# Patient Record
Sex: FEMALE | Race: WHITE | NOT HISPANIC OR LATINO | Employment: OTHER | URBAN - METROPOLITAN AREA
[De-identification: names, ages, dates, MRNs, and addresses within clinical notes are randomized per-mention and may not be internally consistent; named-entity substitution may affect disease eponyms.]

---

## 2018-07-17 ENCOUNTER — RECORDS - HEALTHEAST (OUTPATIENT)
Dept: LAB | Facility: CLINIC | Age: 73
End: 2018-07-17

## 2018-07-18 LAB — C REACTIVE PROTEIN LHE: 1.2 MG/DL (ref 0–0.8)

## 2019-04-30 ENCOUNTER — RECORDS - HEALTHEAST (OUTPATIENT)
Dept: LAB | Facility: CLINIC | Age: 74
End: 2019-04-30

## 2019-04-30 LAB
C REACTIVE PROTEIN LHE: 0.3 MG/DL (ref 0–0.8)
VIT B12 SERPL-MCNC: 473 PG/ML (ref 213–816)

## 2019-08-26 ENCOUNTER — RECORDS - HEALTHEAST (OUTPATIENT)
Dept: LAB | Facility: CLINIC | Age: 74
End: 2019-08-26

## 2019-08-26 LAB — C REACTIVE PROTEIN LHE: 0.4 MG/DL (ref 0–0.8)

## 2020-06-18 ENCOUNTER — RECORDS - HEALTHEAST (OUTPATIENT)
Dept: LAB | Facility: CLINIC | Age: 75
End: 2020-06-18

## 2020-06-19 LAB — C REACTIVE PROTEIN LHE: 0.3 MG/DL (ref 0–0.8)

## 2020-09-16 ENCOUNTER — RECORDS - HEALTHEAST (OUTPATIENT)
Dept: LAB | Facility: CLINIC | Age: 75
End: 2020-09-16

## 2020-09-17 LAB — C REACTIVE PROTEIN LHE: 0.4 MG/DL (ref 0–0.8)

## 2020-11-30 ENCOUNTER — RECORDS - HEALTHEAST (OUTPATIENT)
Dept: LAB | Facility: CLINIC | Age: 75
End: 2020-11-30

## 2020-12-01 LAB — C REACTIVE PROTEIN LHE: 0.5 MG/DL (ref 0–0.8)

## 2021-06-02 ENCOUNTER — OFFICE VISIT (OUTPATIENT)
Dept: DERMATOLOGY | Facility: CLINIC | Age: 76
End: 2021-06-02
Payer: MEDICARE

## 2021-06-02 VITALS — DIASTOLIC BLOOD PRESSURE: 71 MMHG | OXYGEN SATURATION: 98 % | HEART RATE: 70 BPM | SYSTOLIC BLOOD PRESSURE: 169 MMHG

## 2021-06-02 DIAGNOSIS — L81.4 LENTIGO: ICD-10-CM

## 2021-06-02 DIAGNOSIS — L30.9 DERMATITIS: Primary | ICD-10-CM

## 2021-06-02 DIAGNOSIS — L82.1 SEBORRHEIC KERATOSIS: ICD-10-CM

## 2021-06-02 PROCEDURE — 99203 OFFICE O/P NEW LOW 30 MIN: CPT | Performed by: DERMATOLOGY

## 2021-06-02 RX ORDER — OXYBUTYNIN CHLORIDE 15 MG/1
TABLET, EXTENDED RELEASE ORAL DAILY
COMMUNITY
Start: 2021-04-29

## 2021-06-02 RX ORDER — LOSARTAN POTASSIUM 100 MG/1
100 TABLET ORAL DAILY
COMMUNITY
Start: 2020-11-23

## 2021-06-02 RX ORDER — AMLODIPINE BESYLATE 5 MG/1
5 TABLET ORAL DAILY
COMMUNITY
Start: 2020-12-03

## 2021-06-02 RX ORDER — PREDNISONE 1 MG/1
2 TABLET ORAL DAILY
COMMUNITY
Start: 2021-04-14

## 2021-06-02 RX ORDER — FAMOTIDINE 20 MG
TABLET ORAL
COMMUNITY

## 2021-06-02 RX ORDER — BETAMETHASONE DIPROPIONATE 0.5 MG/G
CREAM TOPICAL
Qty: 100 G | Refills: 3 | Status: SHIPPED | OUTPATIENT
Start: 2021-06-02

## 2021-06-02 RX ORDER — SERTRALINE HYDROCHLORIDE 100 MG/1
TABLET, FILM COATED ORAL DAILY
COMMUNITY
Start: 2020-11-27

## 2021-06-02 RX ORDER — MULTIVIT-MIN/IRON/FOLIC ACID/K 18-600-40
CAPSULE ORAL
COMMUNITY

## 2021-06-02 NOTE — LETTER
6/2/2021         RE: Josette Lee  [address] [facility] [address]        Dear Colleague,    Thank you for referring your patient, Josette Lee, to the [facility]. Please see a copy of my visit note below.    Josette Lee is an extremely pleasant 75 year old year old female patient here today for itching on skin.   .   Patient states this has been present for years.  Patient reports the following symptoms:  itching.  Patient reports the following previous treatments otc.  These treatments did not work.  Patient reports the following modifying factors none.  Associated symptoms: using dial soap no emollients.  .  Patient has no other skin complaints today.  Remainder of the HPI, Meds, PMH, Allergies, FH, and SH was reviewed in chart.    No past medical history on file.    No past surgical history on file.     No family history on file.    Social History     Socioeconomic History     Marital status:      Spouse name: Not on file     Number of children: Not on file     Years of education: Not on file     Highest education level: Not on file   Occupational History     Not on file   Social Needs     Financial resource strain: Not on file     Food insecurity     Worry: Not on file     Inability: Not on file     Transportation needs     Medical: Not on file     Non-medical: Not on file   Tobacco Use     Smoking status: Not on file   Substance and Sexual Activity     Alcohol use: Not on file     Drug use: Not on file     Sexual activity: Not on file   Lifestyle     Physical activity     Days per week: Not on file     Minutes per session: Not on file     Stress: Not on file   Relationships     Social connections     Talks on phone: Not on file     Gets together: Not on file     Attends Tenriism service: Not on file     Active member of club or organization: Not on file     Attends meetings of clubs or organizations: Not on file     Relationship status: Not on file      Intimate partner violence     Fear of current or ex partner: Not on file     Emotionally abused: Not on file     Physically abused: Not on file     Forced sexual activity: Not on file   Other Topics Concern     Not on file   Social History Narrative     Not on file       Outpatient Encounter Medications as of 6/2/2021   Medication Sig Dispense Refill     amLODIPine (NORVASC) 5 MG tablet Take 5 mg by mouth daily       Ascorbic Acid (VITAMIN C) 500 MG CAPS        losartan (COZAAR) 100 MG tablet Take 100 mg by mouth daily       oxybutynin ER (DITROPAN XL) 15 MG 24 hr tablet daily       predniSONE (DELTASONE) 1 MG tablet Take 2 mg by mouth daily       sertraline (ZOLOFT) 100 MG tablet daily       Vitamin D, Cholecalciferol, 25 MCG (1000 UT) CAPS        No facility-administered encounter medications on file as of 6/2/2021.              O:   NAD, WDWN, Alert & Oriented, Mood & Affect wnl, Vitals stable   Here today with     SpO2 98%    General appearance normal   Vitals stable   Alert, oriented and in no acute distress     Xerosis and eczmeatous patches on arms    Stuck on papules and brown macules on trunk and ext      The remainder of expanded problem focused exam was normal; the following areas were examined:  conjunctiva/lids, face, neck, , chest, digits/nails, RUE, LUE.      Eyes: Conjunctivae/lids:Normal     ENT: Lips, buccal mucosa, tongue: normal    MSK:Normal    Cardiovascular: peripheral edema none    Pulm: Breathing Normal    Neuro/Psych: Orientation:Alert and Orientedx3 ; Mood/Affect:normal       A/P:  1. Seborrheic keratosis, lentigo,   2. Dermatitis  Pathophysiology discussed with pateint   Stop dial soap  Soap use discussed with patient   claritin in am  Zyrtec bedtime  Emollient use discussed with patient   Betamethasone twice daily  Return to clinic 4 weeks  It was a pleasure speaking to Josette Lee today.  Previous clinic notes and pertinent laboratory tests were reviewed prior to Josette ARIAS  Jesus's visit.  Skin care regimen reviewed with patient: Eliminate harsh soaps, i.e. Dial, zest, irsih spring; Mild soaps such as Cetaphil or Dove sensitive skin, avoid hot or cold showers, aggressive use of emollients including vanicream, cetaphil or cerave discussed with patient.          Again, thank you for allowing me to participate in the care of your patient.        Sincerely,        Emmett Gonzalez MD

## 2021-06-02 NOTE — PROGRESS NOTES
Josette Lee is an extremely pleasant 75 year old year old female patient here today for itching on skin.   .   Patient states this has been present for years.  Patient reports the following symptoms:  itching.  Patient reports the following previous treatments otc.  These treatments did not work.  Patient reports the following modifying factors none.  Associated symptoms: using dial soap no emollients.  .  Patient has no other skin complaints today.  Remainder of the HPI, Meds, PMH, Allergies, FH, and SH was reviewed in chart.    No past medical history on file.    No past surgical history on file.     No family history on file.    Social History     Socioeconomic History     Marital status:      Spouse name: Not on file     Number of children: Not on file     Years of education: Not on file     Highest education level: Not on file   Occupational History     Not on file   Social Needs     Financial resource strain: Not on file     Food insecurity     Worry: Not on file     Inability: Not on file     Transportation needs     Medical: Not on file     Non-medical: Not on file   Tobacco Use     Smoking status: Not on file   Substance and Sexual Activity     Alcohol use: Not on file     Drug use: Not on file     Sexual activity: Not on file   Lifestyle     Physical activity     Days per week: Not on file     Minutes per session: Not on file     Stress: Not on file   Relationships     Social connections     Talks on phone: Not on file     Gets together: Not on file     Attends [employer]ist service: Not on file     Active member of club or organization: Not on file     Attends meetings of clubs or organizations: Not on file     Relationship status: Not on file     Intimate partner violence     Fear of current or ex partner: Not on file     Emotionally abused: Not on file     Physically abused: Not on file     Forced sexual activity: Not on file   Other Topics Concern     Not on file   Social History Narrative      Not on file       Outpatient Encounter Medications as of 6/2/2021   Medication Sig Dispense Refill     amLODIPine (NORVASC) 5 MG tablet Take 5 mg by mouth daily       Ascorbic Acid (VITAMIN C) 500 MG CAPS        losartan (COZAAR) 100 MG tablet Take 100 mg by mouth daily       oxybutynin ER (DITROPAN XL) 15 MG 24 hr tablet daily       predniSONE (DELTASONE) 1 MG tablet Take 2 mg by mouth daily       sertraline (ZOLOFT) 100 MG tablet daily       Vitamin D, Cholecalciferol, 25 MCG (1000 UT) CAPS        No facility-administered encounter medications on file as of 6/2/2021.              O:   NAD, WDWN, Alert & Oriented, Mood & Affect wnl, Vitals stable   Here today with     SpO2 98%    General appearance normal   Vitals stable   Alert, oriented and in no acute distress     Xerosis and eczmeatous patches on arms    Stuck on papules and brown macules on trunk and ext      The remainder of expanded problem focused exam was normal; the following areas were examined:  conjunctiva/lids, face, neck, , chest, digits/nails, RUE, LUE.      Eyes: Conjunctivae/lids:Normal     ENT: Lips, buccal mucosa, tongue: normal    MSK:Normal    Cardiovascular: peripheral edema none    Pulm: Breathing Normal    Neuro/Psych: Orientation:Alert and Orientedx3 ; Mood/Affect:normal       A/P:  1. Seborrheic keratosis, lentigo,   2. Dermatitis  Pathophysiology discussed with pateint   Stop dial soap  Soap use discussed with patient   claritin in am  Zyrtec bedtime  Emollient use discussed with patient   Betamethasone twice daily  Return to clinic 4 weeks  It was a pleasure speaking to Josette Lee today.  Previous clinic notes and pertinent laboratory tests were reviewed prior to Josette Lee's visit.  Skin care regimen reviewed with patient: Eliminate harsh soaps, i.e. Dial, zest, irsih spring; Mild soaps such as Cetaphil or Dove sensitive skin, avoid hot or cold showers, aggressive use of emollients including vanicream, cetaphil or  cerave discussed with patient.

## 2021-06-02 NOTE — PATIENT INSTRUCTIONS
Proper skin care from Dr. Gonzalez- [facility]     Eliminate harsh soaps, i.e. Dial, Zest, Tamara Spring;   Use mild soaps such as Cetaphil or Dove Sensitive Skin   Avoid hot or cold showers   After showering, lightly dry off.    Aggressive use of a moisturizer (including Vanicream, Cetaphil, Aquaphor or Cerave)   We recommend using a tub that needs to be scooped out, not a pump. This has more of an oil base. It will hold moisture in your skin much better than a water base moisturizer. The ones recommended are non- pore clogging.       If you have any questions call [phone] and follow the prompts to Dr. Gonzalez's office.    Claritin (OTC) 1 tab in the morning   Zyrtec (OTC) 1 tab in the evening    Medicated cream

## 2021-06-02 NOTE — NURSING NOTE
Chief Complaint   Patient presents with     Derm Problem       Vitals:    06/02/21 1202   BP: (!) 169/71   BP Location: Right arm   Patient Position: Sitting   Cuff Size: Adult Large   Pulse: 70   SpO2: 98%     Wt Readings from Last 1 Encounters:   No data found for Wt       Ivelisse Arroyo LPN .................6/2/2021

## 2021-06-29 ENCOUNTER — OFFICE VISIT (OUTPATIENT)
Dept: DERMATOLOGY | Facility: CLINIC | Age: 76
End: 2021-06-29
Payer: MEDICARE

## 2021-06-29 VITALS — OXYGEN SATURATION: 99 % | HEIGHT: 60 IN | HEART RATE: 63 BPM

## 2021-06-29 DIAGNOSIS — L30.9 DERMATITIS: Primary | ICD-10-CM

## 2021-06-29 PROCEDURE — 99212 OFFICE O/P EST SF 10 MIN: CPT | Performed by: DERMATOLOGY

## 2021-06-29 NOTE — LETTER
6/29/2021         RE: Josette Lee  [address] [facility] [address]        Dear Colleague,    Thank you for referring your patient, Josette Lee, to the [facility]. Please see a copy of my visit note below.    Josette Lee is an extremely pleasant 75 year old year old female patient here today for f/u dermatitis, all clear.  Patient has no other skin complaints today.  Remainder of the HPI, Meds, PMH, Allergies, FH, and SH was reviewed in chart.    History reviewed. No pertinent past medical history.    History reviewed. No pertinent surgical history.     History reviewed. No pertinent family history.    Social History     Socioeconomic History     Marital status:      Spouse name: Not on file     Number of children: Not on file     Years of education: Not on file     Highest education level: Not on file   Occupational History     Not on file   Social Needs     Financial resource strain: Not on file     Food insecurity     Worry: Not on file     Inability: Not on file     Transportation needs     Medical: Not on file     Non-medical: Not on file   Tobacco Use     Smoking status: Current Every Day Smoker     Types: Cigarettes     Smokeless tobacco: Never Used   Substance and Sexual Activity     Alcohol use: Not on file     Drug use: Not on file     Sexual activity: Not on file   Lifestyle     Physical activity     Days per week: Not on file     Minutes per session: Not on file     Stress: Not on file   Relationships     Social connections     Talks on phone: Not on file     Gets together: Not on file     Attends Mor[employer] service: Not on file     Active member of club or organization: Not on file     Attends meetings of clubs or organizations: Not on file     Relationship status: Not on file     Intimate partner violence     Fear of current or ex partner: Not on file     Emotionally abused: Not on file     Physically abused: Not on file     Forced sexual activity:  Not on file   Other Topics Concern     Not on file   Social History Narrative     Not on file       Outpatient Encounter Medications as of 6/29/2021   Medication Sig Dispense Refill     amLODIPine (NORVASC) 5 MG tablet Take 5 mg by mouth daily       Ascorbic Acid (VITAMIN C) 500 MG CAPS        augmented betamethasone dipropionate (DIPROLENE-AF) 0.05 % external cream Apply sparingly to affected area twice daily as needed.  Do not apply to face. 100 g 3     losartan (COZAAR) 100 MG tablet Take 100 mg by mouth daily       oxybutynin ER (DITROPAN XL) 15 MG 24 hr tablet daily       predniSONE (DELTASONE) 1 MG tablet Take 2 mg by mouth daily       sertraline (ZOLOFT) 100 MG tablet daily       Vitamin D, Cholecalciferol, 25 MCG (1000 UT) CAPS        No facility-administered encounter medications on file as of 6/29/2021.              O:   NAD, WDWN, Alert & Oriented, Mood & Affect wnl, Vitals stable   Here today with     Pulse 63   Ht 1.524 m (5')   SpO2 99%    General appearance normal   Vitals stable   Alert, oriented and in no acute distress     Skin clear       Eyes: Conjunctivae/lids:Normal     ENT: Lips, buccal mucosa, tongue: normal    MSK:Normal    Cardiovascular: peripheral edema none    Pulm: Breathing Normal    Neuro/Psych: Orientation:Alert and Orientedx3 ; Mood/Affect:normal       A/P:  1. Dermatitis clear  Topicals prn   It was a pleasure speaking to Josette JUANA [place]un[place]toria today.  Previous clinic notes and pertinent laboratory tests were reviewed prior to Josette JUANA Lee's visit.  Skin care regimen reviewed with patient: Eliminate harsh soaps, i.e. Dial, zest, irsih spring; Mild soaps such as Cetaphil or Dove sensitive skin, avoid hot or cold showers, aggressive use of emollients including vanicream, cetaphil or cerave discussed with patient.    Return to clinic prn         Again, thank you for allowing me to participate in the care of your patient.        Sincerely,        Emmett Gonzalez MD

## 2021-06-29 NOTE — PROGRESS NOTES
Josette Lee is an extremely pleasant 75 year old year old female patient here today for f/u dermatitis, all clear.  Patient has no other skin complaints today.  Remainder of the HPI, Meds, PMH, Allergies, FH, and SH was reviewed in chart.    History reviewed. No pertinent past medical history.    History reviewed. No pertinent surgical history.     History reviewed. No pertinent family history.    Social History     Socioeconomic History     Marital status:      Spouse name: Not on file     Number of children: Not on file     Years of education: Not on file     Highest education level: Not on file   Occupational History     Not on file   Social Needs     Financial resource strain: Not on file     Food insecurity     Worry: Not on file     Inability: Not on file     Transportation needs     Medical: Not on file     Non-medical: Not on file   Tobacco Use     Smoking status: Current Every Day Smoker     Types: Cigarettes     Smokeless tobacco: Never Used   Substance and Sexual Activity     Alcohol use: Not on file     Drug use: Not on file     Sexual activity: Not on file   Lifestyle     Physical activity     Days per week: Not on file     Minutes per session: Not on file     Stress: Not on file   Relationships     Social connections     Talks on phone: Not on file     Gets together: Not on file     Attends Holiness service: Not on file     Active member of club or organization: Not on file     Attends meetings of clubs or organizations: Not on file     Relationship status: Not on file     Intimate partner violence     Fear of current or ex partner: Not on file     Emotionally abused: Not on file     Physically abused: Not on file     Forced sexual activity: Not on file   Other Topics Concern     Not on file   Social History Narrative     Not on file       Outpatient Encounter Medications as of 6/29/2021   Medication Sig Dispense Refill     amLODIPine (NORVASC) 5 MG tablet Take 5 mg by mouth daily        Ascorbic Acid (VITAMIN C) 500 MG CAPS        augmented betamethasone dipropionate (DIPROLENE-AF) 0.05 % external cream Apply sparingly to affected area twice daily as needed.  Do not apply to face. 100 g 3     losartan (COZAAR) 100 MG tablet Take 100 mg by mouth daily       oxybutynin ER (DITROPAN XL) 15 MG 24 hr tablet daily       predniSONE (DELTASONE) 1 MG tablet Take 2 mg by mouth daily       sertraline (ZOLOFT) 100 MG tablet daily       Vitamin D, Cholecalciferol, 25 MCG (1000 UT) CAPS        No facility-administered encounter medications on file as of 6/29/2021.              O:   NAD, WDWN, Alert & Oriented, Mood & Affect wnl, Vitals stable   Here today with     Pulse 63   Ht 1.524 m (5')   SpO2 99%    General appearance normal   Vitals stable   Alert, oriented and in no acute distress     Skin clear       Eyes: Conjunctivae/lids:Normal     ENT: Lips, buccal mucosa, tongue: normal    MSK:Normal    Cardiovascular: peripheral edema none    Pulm: Breathing Normal    Neuro/Psych: Orientation:Alert and Orientedx3 ; Mood/Affect:normal       A/P:  1. Dermatitis clear  Topicals prn   It was a pleasure speaking to Josette Lee today.  Previous clinic notes and pertinent laboratory tests were reviewed prior to Josette Lee's visit.  Skin care regimen reviewed with patient: Eliminate harsh soaps, i.e. Dial, zest, irsih spring; Mild soaps such as Cetaphil or Dove sensitive skin, avoid hot or cold showers, aggressive use of emollients including vanicream, cetaphil or cerave discussed with patient.    Return to clinic prn

## 2021-06-29 NOTE — NURSING NOTE
Chief Complaint   Patient presents with     Derm Problem       Vitals:    06/29/21 1127   Pulse: 63   SpO2: 99%   Height: 1.524 m (5')     Wt Readings from Last 1 Encounters:   No data found for Wt       Jocelyn Killian LPN.................6/29/2021